# Patient Record
(demographics unavailable — no encounter records)

---

## 2024-10-15 NOTE — ASSESSMENT
[FreeTextEntry1] : YO vs OAB and management reviewed. OAB txs reviewed. Behavioral modifications, bladder training, Getmesa SEs, PTNS botox or SNM discussed. Con't Gemtesa 1 more year with hopes of weaning down in future. RTO 1 year or prn. Rx renewed. All ques answered.

## 2024-10-15 NOTE — HISTORY OF PRESENT ILLNESS
[FreeTextEntry1] : Patient last seen by myself early 2023. She is a P2, with DIMITRI. On exam, she had a neg CST, +urethral hypermob, and no signif POP on POPQ, and PVR was normal. She has undergone periurethral bulking injections, and Gemtesa has been rx'd. Still denies YO since the urethral bulking. Gemtesa helps reduce freq urge, no UUI present. No dysuria, UTIs, subj incomplete emptying, gross hematuria, vaginal bulge. Happy on Gemtesa and denies SEs. DRinks "a lot of water."  PMH hyperlipidemia, mitral regurg, LBP, osteoporosis PSH C/S x 2, liposuction Prior tobacco smoker NKDA  UDS: pvr normal at end? (unclear PVR 85 ml at start and capacity only 70 ml fill, however voided over 300 ml at end), no DO, +GSUI, no ISD.

## 2024-11-05 NOTE — PHYSICAL EXAM
[Chaperone Present] : A chaperone was present in the examining room during all aspects of the physical examination [[id number]] : A chaperone was present during the pelvic exam. [Appropriately responsive] : appropriately responsive [Alert] : alert [No Acute Distress] : no acute distress [Oriented x3] : oriented x3 [Labia Majora] : normal [Labia Minora] : normal [Labia Majora Erythema] : erythema [Labia Minora Erythema] : erythema [Atrophy] : atrophy [Dry Mucosa] : dry mucosa [No Bleeding] : There was no active vaginal bleeding [Normal] : normal [Uterine Adnexae] : non-palpable

## 2024-11-05 NOTE — PLAN
[FreeTextEntry1] : Erythematous externally with vaginal discharge consistent with yeast. Prescriptions for Lotrisone, Terconazole, & Diflucan given. Aptima culture collected to r/o other vaginal infections.   Will switch from Yuvafem to Estring as the pt is still having significant pain with sex. Rx sent for Estring given.   F/u annually or as needed.

## 2024-11-05 NOTE — HISTORY OF PRESENT ILLNESS
[HPV test offered] : HPV test offered [N] : Patient does not use contraception [Y] : Positive pregnancy history [unknown] : Patient is unsure of the date of her LMP [Menarche Age: ____] : age at menarche was [unfilled] [Currently Active] : currently active [Men] : men [Mammogramdate] : 10/21/2024 [TextBox_19] : BR2 [BreastSonogramDate] : 10/21/2024 [TextBox_25] : BR2 [PapSmeardate] : 03/29/2024 [TextBox_31] : Negative [BoneDensityDate] : 04/26/2024 [TextBox_37] : Osteoporosis  [TextBox_43] : [place]: 2024 WNL as per pt [HPVDate] : 03/29/2024 [TextBox_78] : Negative [PGHxTotal] : 2 [facility] [FreeTextEntry1] : C/S: 2 Hysterectomy: No D&C: No Gardasil Vaccine: No

## 2024-11-05 NOTE — PHYSICAL EXAM
[Chaperone Present] : A chaperone was present in the examining room during all aspects of the physical examination [[id number]] : A chaperone was present during the pelvic exam. [Appropriately responsive] : appropriately responsive [Alert] : alert [No Acute Distress] : no acute distress [Oriented x3] : oriented x3 [Labia Majora] : normal [Labia Majora Erythema] : erythema [Labia Minora] : normal [Labia Minora Erythema] : erythema [Atrophy] : atrophy [Dry Mucosa] : dry mucosa [No Bleeding] : There was no active vaginal bleeding [Normal] : normal [Uterine Adnexae] : non-palpable

## 2024-11-05 NOTE — END OF VISIT
[FreeTextEntry3] : I, Kelsea Ayon solely acted as scribe for Dr. Mariposa Garcia on 10/28/2024  All medical entries made by the Scribe were at my, Dr. Sims, direction and personally dictated by me on 10/28/2024 . I have reviewed the chart and agree that the record accurately reflects my personal performance of the history, physical exam, assessment and plan. I have also personally directed, reviewed, and agreed with the chart.

## 2024-11-05 NOTE — HISTORY OF PRESENT ILLNESS
[HPV test offered] : HPV test offered [N] : Patient does not use contraception [Y] : Positive pregnancy history [unknown] : Patient is unsure of the date of her LMP [Menarche Age: ____] : age at menarche was [unfilled] [Currently Active] : currently active [Men] : men [Mammogramdate] : 10/21/2024 [TextBox_19] : BR2 [BreastSonogramDate] : 10/21/2024 [TextBox_25] : BR2 [PapSmeardate] : 03/29/2024 [TextBox_31] : Negative [BoneDensityDate] : 04/26/2024 [TextBox_37] : Osteoporosis  [TextBox_43] : [place]: 2024 WNL as per pt [HPVDate] : 03/29/2024 [TextBox_78] : Negative [PGHxTotal] : 2 [[facility] : 2 [[facility] : 2 [FreeTextEntry1] : C/S: 2 Hysterectomy: No D&C: No Gardasil Vaccine: No

## 2024-12-30 NOTE — HISTORY OF PRESENT ILLNESS
[FreeTextEntry1] : Pt presents to office for f/u on DIMITRI.  YO remains controlled s/p periurethral bulking on 4/7/23.  She was on gemtesa for urgency/frequency, but this was no longer covered by insurance.  Medication was changed to mirabegron 25mg 11/21/24.  Today she reports 100% improvement in her symptoms with the mirabegron.  States that she was "miserable" when she ran out of the gemtesa stating she had "UTI's and yeast infections" and needed to see her PMD.  She denies any SE from the medication.  Denies any dysuria, incomplete emptying or UTI symptoms today.  She does not want to come off the medication.

## 2024-12-30 NOTE — DISCUSSION/SUMMARY
[FreeTextEntry1] : Pt happy with mirabegron 25mg and would like to continue.  We discussed that the medication is not intended to prevent UTI or yeast infections, it is a treatment for urgency/frequency/UUI and she verbalizes understanding.  eRx sent.  F/u 9-12 months or sooner if needed.  Instructed to call with any questions or concerns and she verbalizes understanding.

## 2025-02-03 NOTE — ASSESSMENT
[FreeTextEntry1] : RICKY CARMONA is a 61 year old F with a past medical history as above who presents for general follow up and blood work,

## 2025-02-03 NOTE — PHYSICAL EXAM
[No Acute Distress] : no acute distress [Well Nourished] : well nourished [Well Developed] : well developed [Well-Appearing] : well-appearing [Normal Voice/Communication] : normal voice/communication [Normal Sclera/Conjunctiva] : normal sclera/conjunctiva [PERRL] : pupils equal round and reactive to light [EOMI] : extraocular movements intact [Normal Outer Ear/Nose] : the outer ears and nose were normal in appearance [Normal Oropharynx] : the oropharynx was normal [Normal TMs] : both tympanic membranes were normal [Normal Nasal Mucosa] : the nasal mucosa was normal [No JVD] : no jugular venous distention [No Lymphadenopathy] : no lymphadenopathy [Supple] : supple [Thyroid Normal, No Nodules] : the thyroid was normal and there were no nodules present [No Respiratory Distress] : no respiratory distress  [No Accessory Muscle Use] : no accessory muscle use [Clear to Auscultation] : lungs were clear to auscultation bilaterally [Normal Rate] : normal rate  [Regular Rhythm] : with a regular rhythm [Normal S1, S2] : normal S1 and S2 [No Murmur] : no murmur heard [No Carotid Bruits] : no carotid bruits [No Abdominal Bruit] : a ~M bruit was not heard ~T in the abdomen [Pedal Pulses Present] : the pedal pulses are present [No Edema] : there was no peripheral edema [No Palpable Aorta] : no palpable aorta [Soft] : abdomen soft [Non Tender] : non-tender [Non-distended] : non-distended [No Masses] : no abdominal mass palpated [No HSM] : no HSM [Normal Bowel Sounds] : normal bowel sounds [Normal Supraclavicular Nodes] : no supraclavicular lymphadenopathy [Normal Posterior Cervical Nodes] : no posterior cervical lymphadenopathy [Normal Anterior Cervical Nodes] : no anterior cervical lymphadenopathy [No CVA Tenderness] : no CVA  tenderness [No Spinal Tenderness] : no spinal tenderness [No Joint Swelling] : no joint swelling [Grossly Normal Strength/Tone] : grossly normal strength/tone [No Rash] : no rash [Coordination Grossly Intact] : coordination grossly intact [No Focal Deficits] : no focal deficits [Normal Gait] : normal gait [Deep Tendon Reflexes (DTR)] : deep tendon reflexes were 2+ and symmetric [Speech Grossly Normal] : speech grossly normal [Memory Grossly Normal] : memory grossly normal [Normal Affect] : the affect was normal [Alert and Oriented x3] : oriented to person, place, and time [Normal Mood] : the mood was normal [Normal Insight/Judgement] : insight and judgment were intact [Normal] : affect was normal and insight and judgment were intact [Kyphosis] : no kyphosis [Scoliosis] : no scoliosis [Acne] : no acne [de-identified] : overweight

## 2025-02-03 NOTE — PLAN
[FreeTextEntry1] : Pulmonary snoring - possibly secondary to JAKE - previously referred to pulmonologist, Dr. Gomes to further discuss home sleep study - discussed the ramifications of untreated JAKE; patient aware of risks - c/w weight loss Cardiology hyperlipidemia - continue low cholesterol/low fat diet - check FLP, c/w Ezetimibe 10 mg qd,  consider checking CT heart/calcium score Endocrinology hypothyroidism - restart Levothyroxine Sodium 25mcg p.o.q.d. as directed-check TTFs overweight/weight gain - RX Qsymia 11.25-69  MG p.o.q.d; continue low carbohydrate/low sugar diet; continue CV exercise - continue to follow up with nutritionist Dermatology hair loss - had negative medical work up, previously discussed PRP; continue Minoxidil 10 MG p.o.d.q. as directed; continue Spironolactone 100 MG p.o.q.d. as directed; advised to continue f/u with dermatologist/endocrinologist Genetics/Oncology - referred to  to assess for BRACA secondary to recent family history of neoplasm of the breast.   check- CBC, CMP, Lipid Profile, Anti Thyroid Antibodies, Free T3-4, TSH, Vitamin D

## 2025-02-03 NOTE — HISTORY OF PRESENT ILLNESS
[FreeTextEntry1] : fasting blood work and general follow-up  [de-identified] : Patient is a 61-year-old female with a past medical history as below who presents for blood work and general follow-up. Patient is taking all medications as prescribed and denies any adverse reactions. Patient is taking Qsymia 11.25/69 mg qd and has lost 29 pounds since starting the medication in February 2023. She has gained 3 pounds in the past 3 months since her last visit,. Patient has been exercising regularly (treadmill, Pilates). She has been maintaining a healthy diet with no chicken and red meat since 1/28/2024. She has started Ezetimibe 10 mg qd (would not go on a statin).  Patient inquired about getting BRACA gene testing as her mother recently was diagnosed with breast cancer.

## 2025-02-03 NOTE — END OF VISIT
[FreeTextEntry3] : I, Kisha Carr, acted solely as scribe for Dr. Vic Gagnon DO on this date 02/03/2025.  All medical record entries made by the Scribe were at my, Dr. Vic Gagnon DO direction and personally dictated by me on 02/03/2025, I have reviewed the chart and agree that the record accurately reflects my personal performance of the history, physical exam, assessment and plan. I have also personally directed, reviewed and agreed with the chart.   [Time Spent: ___ minutes] : I have spent [unfilled] minutes of time on the encounter which excludes teaching and separately reported services.

## 2025-02-03 NOTE — HISTORY OF PRESENT ILLNESS
[FreeTextEntry1] : fasting blood work and general follow-up  [de-identified] : Patient is a 61-year-old female with a past medical history as below who presents for blood work and general follow-up. Patient is taking all medications as prescribed and denies any adverse reactions. Patient is taking Qsymia 11.25/69 mg qd and has lost 29 pounds since starting the medication in February 2023. She has gained 3 pounds in the past 3 months since her last visit,. Patient has been exercising regularly (treadmill, Pilates). She has been maintaining a healthy diet with no chicken and red meat since 1/28/2024. She has started Ezetimibe 10 mg qd (would not go on a statin).  Patient inquired about getting BRACA gene testing as her mother recently was diagnosed with breast cancer.

## 2025-02-03 NOTE — PHYSICAL EXAM
[No Acute Distress] : no acute distress [Well Nourished] : well nourished [Well Developed] : well developed [Well-Appearing] : well-appearing [Normal Voice/Communication] : normal voice/communication [Normal Sclera/Conjunctiva] : normal sclera/conjunctiva [PERRL] : pupils equal round and reactive to light [EOMI] : extraocular movements intact [Normal Outer Ear/Nose] : the outer ears and nose were normal in appearance [Normal Oropharynx] : the oropharynx was normal [Normal TMs] : both tympanic membranes were normal [Normal Nasal Mucosa] : the nasal mucosa was normal [No JVD] : no jugular venous distention [No Lymphadenopathy] : no lymphadenopathy [Supple] : supple [Thyroid Normal, No Nodules] : the thyroid was normal and there were no nodules present [No Respiratory Distress] : no respiratory distress  [No Accessory Muscle Use] : no accessory muscle use [Clear to Auscultation] : lungs were clear to auscultation bilaterally [Normal Rate] : normal rate  [Regular Rhythm] : with a regular rhythm [Normal S1, S2] : normal S1 and S2 [No Murmur] : no murmur heard [No Carotid Bruits] : no carotid bruits [No Abdominal Bruit] : a ~M bruit was not heard ~T in the abdomen [Pedal Pulses Present] : the pedal pulses are present [No Edema] : there was no peripheral edema [No Palpable Aorta] : no palpable aorta [Soft] : abdomen soft [Non Tender] : non-tender [Non-distended] : non-distended [No Masses] : no abdominal mass palpated [No HSM] : no HSM [Normal Bowel Sounds] : normal bowel sounds [Normal Supraclavicular Nodes] : no supraclavicular lymphadenopathy [Normal Posterior Cervical Nodes] : no posterior cervical lymphadenopathy [Normal Anterior Cervical Nodes] : no anterior cervical lymphadenopathy [No CVA Tenderness] : no CVA  tenderness [No Spinal Tenderness] : no spinal tenderness [No Joint Swelling] : no joint swelling [Grossly Normal Strength/Tone] : grossly normal strength/tone [No Rash] : no rash [Coordination Grossly Intact] : coordination grossly intact [No Focal Deficits] : no focal deficits [Normal Gait] : normal gait [Deep Tendon Reflexes (DTR)] : deep tendon reflexes were 2+ and symmetric [Speech Grossly Normal] : speech grossly normal [Memory Grossly Normal] : memory grossly normal [Normal Affect] : the affect was normal [Alert and Oriented x3] : oriented to person, place, and time [Normal Mood] : the mood was normal [Normal Insight/Judgement] : insight and judgment were intact [Normal] : affect was normal and insight and judgment were intact [Kyphosis] : no kyphosis [Scoliosis] : no scoliosis [Acne] : no acne [de-identified] : overweight

## 2025-03-31 NOTE — PHYSICAL EXAM
[Chaperone Present] : A chaperone was present in the examining room during all aspects of the physical examination [Appropriately responsive] : appropriately responsive [Alert] : alert [No Acute Distress] : no acute distress [Soft] : soft [Non-tender] : non-tender [Non-distended] : non-distended [Oriented x3] : oriented x3 [Examination Of The Breasts] : a normal appearance [No Discharge] : no discharge [No Masses] : no breast masses were palpable [Labia Majora] : normal [Labia Minora] : normal [Atrophy] : atrophy [Dry Mucosa] : dry mucosa [No Bleeding] : There was no active vaginal bleeding [Normal] : normal [Discharge] : a  ~M vaginal discharge was present [Moderate] : moderate [White] : white [Thick] : thick [Uterine Adnexae] : non-palpable

## 2025-03-31 NOTE — END OF VISIT
[FreeTextEntry3] : I, Kelsea Ayon solely acted as scribe for Dr. Mariposa Garcia on 03/31/2025  All medical entries made by the Scribe were at my, Dr. Sims, direction and personally dictated by me on 03/31/2025 . I have reviewed the chart and agree that the record accurately reflects my personal performance of the history, physical exam, assessment and plan. I have also personally directed, reviewed, and agreed with the chart.

## 2025-03-31 NOTE — HISTORY OF PRESENT ILLNESS
[LMP unknown] : LMP unknown [N] : Patient does not use contraception [Y] : Positive pregnancy history [Mammogramdate] : 10/21/24 [TextBox_19] : Br2 [BreastSonogramDate] : 10/21/24 [TextBox_25] : Br2 [PapSmeardate] : 3/29/24 [TextBox_31] : Neg [BoneDensityDate] : 4/26/24 [TextBox_37] : Osteoporosis [TextBox_43] : [place], 2024 Normal  [SyphilisDate] : 4/10/24 [TextBox_58] : Neg  [GonorrheaDate] : 4/10/24 [TextBox_63] : Neg [HPVDate] : 3/29/24 [TextBox_78] : Neg [PGHxTotal] : 2 [[facility]] : 2 [[facility]] : 2 [FreeTextEntry1] : C/sec: 2

## 2025-03-31 NOTE — HISTORY OF PRESENT ILLNESS
[LMP unknown] : LMP unknown [N] : Patient does not use contraception [Y] : Positive pregnancy history [Mammogramdate] : 10/21/24 [TextBox_19] : Br2 [BreastSonogramDate] : 10/21/24 [TextBox_25] : Br2 [PapSmeardate] : 3/29/24 [TextBox_31] : Neg [BoneDensityDate] : 4/26/24 [TextBox_37] : Osteoporosis [TextBox_43] : [place], 2024 Normal  [SyphilisDate] : 4/10/24 [TextBox_58] : Neg  [GonorrheaDate] : 4/10/24 [TextBox_63] : Neg [HPVDate] : 3/29/24 [TextBox_78] : Neg [PGHxTotal] : 2 [[facility]] : 2 [[facility]] : 2 [FreeTextEntry1] : C/sec: 2

## 2025-03-31 NOTE — PLAN
[FreeTextEntry1] : Up to date with colon cancer screening.   Osteoporosis. On Prolia. Continue care with rheumatology.   Pap deferred until 2027 per ASCCP guidelines.   Prescription for Diflucan given secondary to a possible yeast infection.   Prescription renewal for estring given.   Prescription for mammogram screening and breast US given. Self-breast exam reviewed.  She will follow up annually and as needed.

## 2025-04-17 NOTE — HISTORY OF PRESENT ILLNESS
[FreeTextEntry1] : RICKY 62 yo  LMP  is here today for a possible infection.   Pt reports burning with urination and has constant irritation.   States that sex is still feels like needles.  She did use the Lotrisone cream on the outside and did take diflucan orally. She put metrogel inside the vagina last night.   [Mammogramdate] : 10/21/2024 [TextBox_19] : BR2 [BreastSonogramDate] : 10/21/2024 [TextBox_25] : BR2 [PapSmeardate] : 03/29/2024 [BoneDensityDate] : 04/26/2024 [TextBox_31] : NEG [TextBox_37] : OSTEOPOROSIS [GonorrheaDate] : 04/10/2024 [TextBox_63] : NEG [ChlamydiaDate] : 04/10/2024 [TextBox_68] : NEG [TrichomonasDate] : 04/10/2024 [TextBox_73] : NEG [HPVDate] : 03/29/2024 [TextBox_78] : NEG [PGHxTotal] : 2 [[facility]] : 2 [[facility]] : 2

## 2025-04-17 NOTE — END OF VISIT
[FreeTextEntry3] : I, Kelsea Ayon solely acted as scribe for Dr. Mariposa Garcia on 04/14/2025  All medical entries made by the Scribe were at my, Dr. Sims, direction and personally dictated by me on 04/14/2025 . I have reviewed the chart and agree that the record accurately reflects my personal performance of the history, physical exam, assessment and plan. I have also personally directed, reviewed, and agreed with the chart.

## 2025-04-17 NOTE — PLAN
[FreeTextEntry1] : Erythematous externally consistent with yeast despite lotrisone use. Will try triamcinolone and estrace cream. She will use triamcinolone cream morning and night, and then estrace in the middle of the day once a day.  Will continue metrogel for one more night as she has already started it. Prescription renewal for metrogel given. If the aptima culture shows yeast, will switch to terconazole.   Aptima culture collected to r/o an internal vaginal infection. UCX sent out to r/o a UTI.   F/u annually or as needed.

## 2025-04-17 NOTE — HISTORY OF PRESENT ILLNESS
[FreeTextEntry1] : RICKY 60 yo  LMP  is here today for a possible infection.   Pt reports burning with urination and has constant irritation.   States that sex is still feels like needles.  She did use the Lotrisone cream on the outside and did take diflucan orally. She put metrogel inside the vagina last night.   [Mammogramdate] : 10/21/2024 [TextBox_19] : BR2 [BreastSonogramDate] : 10/21/2024 [TextBox_25] : BR2 [PapSmeardate] : 03/29/2024 [BoneDensityDate] : 04/26/2024 [TextBox_31] : NEG [TextBox_37] : OSTEOPOROSIS [GonorrheaDate] : 04/10/2024 [TextBox_63] : NEG [ChlamydiaDate] : 04/10/2024 [TextBox_68] : NEG [TrichomonasDate] : 04/10/2024 [TextBox_73] : NEG [HPVDate] : 03/29/2024 [TextBox_78] : NEG [PGHxTotal] : 2 [[facility]] : 2 [[facility]] : 2

## 2025-05-05 NOTE — HISTORY OF PRESENT ILLNESS
[FreeTextEntry1] : medical weight loss program and general follow-up  [de-identified] : Patient is a 61-year-old female with a past medical history as below who presents for medical weight loss program and general follow-up. Patient is taking all medications as prescribed and denies any adverse reactions. Patient is taking Qsymia 15/92 mg qd and has stopped losing weight and dose causes adverse effects. She has gained 8 pounds since her last visit in April of 2025. Patient is inquiring about starting a GLP-1 agonist, alternate to Qsymia. Patient has been exercising regularly (treadmill, Pilates). She has been maintaining a healthy diet with no chicken and red meat since 1/28/2024. She has started Ezetimibe 10 mg qd (would not go on a statin).   Patient is taking Prolia injections secondary to Osteoporosis, manage by endocrinologist, Dr. Janice Soriano.  Patient inquire about getting a sleep study due to her h/o snoring, daytime somnolence.

## 2025-05-05 NOTE — REVIEW OF SYSTEMS
[Recent Change In Weight] : ~T recent weight change [Negative] : Heme/Lymph [FreeTextEntry6] : snoring, daytime somnolence

## 2025-05-05 NOTE — ASSESSMENT
[FreeTextEntry1] : RICKY CARMONA is a 61 year old F with a past medical history as above who presents for general follow up and medical weight loss program.

## 2025-05-05 NOTE — PHYSICAL EXAM
[No Acute Distress] : no acute distress [Well Nourished] : well nourished [Well Developed] : well developed [Well-Appearing] : well-appearing [Normal Voice/Communication] : normal voice/communication [Normal Sclera/Conjunctiva] : normal sclera/conjunctiva [PERRL] : pupils equal round and reactive to light [EOMI] : extraocular movements intact [Normal Outer Ear/Nose] : the outer ears and nose were normal in appearance [Normal Oropharynx] : the oropharynx was normal [Normal TMs] : both tympanic membranes were normal [Normal Nasal Mucosa] : the nasal mucosa was normal [No JVD] : no jugular venous distention [No Lymphadenopathy] : no lymphadenopathy [Supple] : supple [Thyroid Normal, No Nodules] : the thyroid was normal and there were no nodules present [No Respiratory Distress] : no respiratory distress  [No Accessory Muscle Use] : no accessory muscle use [Clear to Auscultation] : lungs were clear to auscultation bilaterally [Normal Rate] : normal rate  [Regular Rhythm] : with a regular rhythm [Normal S1, S2] : normal S1 and S2 [No Murmur] : no murmur heard [No Carotid Bruits] : no carotid bruits [No Abdominal Bruit] : a ~M bruit was not heard ~T in the abdomen [Pedal Pulses Present] : the pedal pulses are present [No Edema] : there was no peripheral edema [No Palpable Aorta] : no palpable aorta [Soft] : abdomen soft [Non Tender] : non-tender [Non-distended] : non-distended [No Masses] : no abdominal mass palpated [No HSM] : no HSM [Normal Bowel Sounds] : normal bowel sounds [Normal Supraclavicular Nodes] : no supraclavicular lymphadenopathy [Normal Posterior Cervical Nodes] : no posterior cervical lymphadenopathy [Normal Anterior Cervical Nodes] : no anterior cervical lymphadenopathy [No CVA Tenderness] : no CVA  tenderness [No Spinal Tenderness] : no spinal tenderness [No Joint Swelling] : no joint swelling [Grossly Normal Strength/Tone] : grossly normal strength/tone [No Rash] : no rash [Coordination Grossly Intact] : coordination grossly intact [No Focal Deficits] : no focal deficits [Normal Gait] : normal gait [Deep Tendon Reflexes (DTR)] : deep tendon reflexes were 2+ and symmetric [Speech Grossly Normal] : speech grossly normal [Memory Grossly Normal] : memory grossly normal [Normal Affect] : the affect was normal [Alert and Oriented x3] : oriented to person, place, and time [Normal Mood] : the mood was normal [Normal Insight/Judgement] : insight and judgment were intact [Normal] : affect was normal and insight and judgment were intact [Kyphosis] : no kyphosis [Scoliosis] : no scoliosis [Acne] : no acne [de-identified] : overweight

## 2025-05-05 NOTE — HEALTH RISK ASSESSMENT
[4 or more  times a week (4 pts)] : 4 or more  times a week (4 points) [1 or 2 (0 pts)] : 1 or 2 (0 points) [Less than monthly (1 pt)] : Less than monthly (1 point) [No] : In the past 12 months have you used drugs other than those required for medical reasons? No [Little interest or pleasure doing things] : 1) Little interest or pleasure doing things [Feeling down, depressed, or hopeless] : 2) Feeling down, depressed, or hopeless [0] : 2) Feeling down, depressed, or hopeless: Not at all (0) [PHQ-2 Negative - No further assessment needed] : PHQ-2 Negative - No further assessment needed [Audit-CScore] : 5 [BFP2Lqbom] : 0

## 2025-05-05 NOTE — END OF VISIT
[FreeTextEntry3] : I, Kisha Carr, acted solely as scribe for Dr. Vic Gagnon DO on this date 05/05/2025.   All medical record entries made by the Scribe were at my, Dr. Vic Gagnon DO direction and personally dictated by me on 05/05/2025, I have reviewed the chart and agree that the record accurately reflects my personal performance of the history, physical exam, assessment and plan. I have also personally directed, reviewed and agreed with the chart.     [Time Spent: ___ minutes] : I have spent [unfilled] minutes of time on the encounter which excludes teaching and separately reported services.

## 2025-05-05 NOTE — PHYSICAL EXAM
[No Acute Distress] : no acute distress [Well Nourished] : well nourished [Well Developed] : well developed [Well-Appearing] : well-appearing [Normal Voice/Communication] : normal voice/communication [Normal Sclera/Conjunctiva] : normal sclera/conjunctiva [PERRL] : pupils equal round and reactive to light [EOMI] : extraocular movements intact [Normal Outer Ear/Nose] : the outer ears and nose were normal in appearance [Normal Oropharynx] : the oropharynx was normal [Normal TMs] : both tympanic membranes were normal [Normal Nasal Mucosa] : the nasal mucosa was normal [No JVD] : no jugular venous distention [No Lymphadenopathy] : no lymphadenopathy [Supple] : supple [Thyroid Normal, No Nodules] : the thyroid was normal and there were no nodules present [No Respiratory Distress] : no respiratory distress  [No Accessory Muscle Use] : no accessory muscle use [Clear to Auscultation] : lungs were clear to auscultation bilaterally [Normal Rate] : normal rate  [Regular Rhythm] : with a regular rhythm [Normal S1, S2] : normal S1 and S2 [No Murmur] : no murmur heard [No Carotid Bruits] : no carotid bruits [No Abdominal Bruit] : a ~M bruit was not heard ~T in the abdomen [Pedal Pulses Present] : the pedal pulses are present [No Edema] : there was no peripheral edema [No Palpable Aorta] : no palpable aorta [Soft] : abdomen soft [Non Tender] : non-tender [Non-distended] : non-distended [No Masses] : no abdominal mass palpated [No HSM] : no HSM [Normal Bowel Sounds] : normal bowel sounds [Normal Supraclavicular Nodes] : no supraclavicular lymphadenopathy [Normal Posterior Cervical Nodes] : no posterior cervical lymphadenopathy [Normal Anterior Cervical Nodes] : no anterior cervical lymphadenopathy [No CVA Tenderness] : no CVA  tenderness [No Spinal Tenderness] : no spinal tenderness [No Joint Swelling] : no joint swelling [Grossly Normal Strength/Tone] : grossly normal strength/tone [No Rash] : no rash [Coordination Grossly Intact] : coordination grossly intact [No Focal Deficits] : no focal deficits [Normal Gait] : normal gait [Deep Tendon Reflexes (DTR)] : deep tendon reflexes were 2+ and symmetric [Speech Grossly Normal] : speech grossly normal [Memory Grossly Normal] : memory grossly normal [Normal Affect] : the affect was normal [Alert and Oriented x3] : oriented to person, place, and time [Normal Mood] : the mood was normal [Normal Insight/Judgement] : insight and judgment were intact [Normal] : affect was normal and insight and judgment were intact [Kyphosis] : no kyphosis [Scoliosis] : no scoliosis [Acne] : no acne [de-identified] : overweight

## 2025-05-05 NOTE — COUNSELING
[Potential consequences of obesity discussed] : Potential consequences of obesity discussed [Benefits of weight loss discussed] : Benefits of weight loss discussed [Structured Weight Management Program suggested:] : Structured weight management program suggested [Encouraged to increase physical activity] : Encouraged to increase physical activity [Target Wt Loss Goal ___] : Weight Loss Goals: Target weight loss goal [unfilled] lbs [Weigh Self Weekly] : weigh self weekly [Decrease Portions] : decrease portions [____ min/wk Activity] : [unfilled] min/wk activity [Good understanding] : Patient has a good understanding of disease, goals and obesity follow-up plan [FreeTextEntry1] : low carbohydrate diet [FreeTextEntry4] : 15

## 2025-05-05 NOTE — PLAN
[FreeTextEntry1] : Pulmonary snoring/daytime somnolence - possibly secondary to JAKE - Rx referral provided to pulmonologist, Dr. Bassam Farmer to further discuss home sleep study - discussed the ramifications of untreated JAKE; patient aware of risks - c/w weight loss Cardiology hyperlipidemia - continue low cholesterol/low fat diet - check FLP in 3 months, c/w Ezetimibe 10 mg qd,  consider checking CT heart/calcium score Endocrinology hypothyroidism - c/w Levothyroxine Sodium 25mcg p.o.q.d. as directed-check TTFs in 3 months overweight/weight gain - stop Qsymia; Discussed  starting Zepbound 2.5mg SQ weekly pending medical insurance coverage clearance; discussed common side effects including gastrointestinal symptoms like constipation, diarrhea, GERD, nausea and vomiting and more significant adverse effects like pancreatitis, and increased risk of thyroid cancer. Patient understand plan and related adverse effects; continue low carbohydrate/low sugar diet; continue CV exercise - continue to follow up with nutritionist Endocrinology Osteoporosis- c/w Prolia 60mg/mL SQ 1q 6months; continue to f/u with Dr. Janice Soriano Dermatology hair loss - had negative medical work up, previously discussed PRP; continue Minoxidil 10 MG p.o.d.q. as directed; continue Spironolactone 100 MG p.o.q.d. as directed; advised to continue f/u with dermatologist/endocrinologist  follow up in 3 months

## 2025-05-05 NOTE — HEALTH RISK ASSESSMENT
[4 or more  times a week (4 pts)] : 4 or more  times a week (4 points) [1 or 2 (0 pts)] : 1 or 2 (0 points) [Less than monthly (1 pt)] : Less than monthly (1 point) [No] : In the past 12 months have you used drugs other than those required for medical reasons? No [Little interest or pleasure doing things] : 1) Little interest or pleasure doing things [Feeling down, depressed, or hopeless] : 2) Feeling down, depressed, or hopeless [0] : 2) Feeling down, depressed, or hopeless: Not at all (0) [PHQ-2 Negative - No further assessment needed] : PHQ-2 Negative - No further assessment needed [Audit-CScore] : 5 [[id number]] : 0

## 2025-05-05 NOTE — HISTORY OF PRESENT ILLNESS
[FreeTextEntry1] : medical weight loss program and general follow-up  [de-identified] : Patient is a 61-year-old female with a past medical history as below who presents for medical weight loss program and general follow-up. Patient is taking all medications as prescribed and denies any adverse reactions. Patient is taking Qsymia 15/92 mg qd and has stopped losing weight and dose causes adverse effects. She has gained 8 pounds since her last visit in April of 2025. Patient is inquiring about starting a GLP-1 agonist, alternate to Qsymia. Patient has been exercising regularly (treadmill, Pilates). She has been maintaining a healthy diet with no chicken and red meat since 1/28/2024. She has started Ezetimibe 10 mg qd (would not go on a statin).   Patient is taking Prolia injections secondary to Osteoporosis, manage by endocrinologist, Dr. Janice Soriano.  Patient inquire about getting a sleep study due to her h/o snoring, daytime somnolence.

## 2025-07-08 NOTE — PLAN
[TextEntry] : Sleep study. Reviewed with the patient what sleep apnea is, pathophysiology of sleep apnea. Reviewed how we diagnose it. Reviewed short and long term health consequences. All questions answered.

## 2025-07-08 NOTE — PHYSICAL EXAM
[No Acute Distress] : no acute distress [Low Lying Soft Palate] : low lying soft palate [Elongated Uvula] : elongated uvula [Enlarged Base of the Tongue] : enlarged base of the tongue [III] : Mallampati Class: III [Normal Rate/Rhythm] : normal rate/rhythm [Normal S1, S2] : normal s1, s2 [No Resp Distress] : no resp distress [No Acc Muscle Use] : no acc muscle use [Normal Rhythm and Effort] : normal rhythm and effort [Clear to Auscultation Bilaterally] : clear to auscultation bilaterally [Normal Color/ Pigmentation] : normal color/ pigmentation [Oriented x3] : oriented x3 [Normal Mood] : normal mood [Normal Affect] : normal affect

## 2025-07-08 NOTE — HISTORY OF PRESENT ILLNESS
[Former] : former [< 20 pack-years] : < 20 pack-years [TextBox_4] : Wakes up gasping, has snoring, EDS with ESS 19.  To bed: 10:15-11:15p Latency: < 5min WASO: 2-3X for BR Awake: 6-6:40a  Was on Qysmia for weight loss. Reported worsened lethargy. Now on ozempic. Feeling better.   Exercises every day on treadmill, weights.   Works FT selling wine.   Stopped smoking in her 20s. [ESS] : 19

## 2025-07-13 NOTE — PLAN
[FreeTextEntry1] : Pulmonary snoring/daytime somnolence - possibly secondary to JAKE - await PSG through Dr. Farmer Cardiology hyperlipidemia - continue low cholesterol/low fat diet - c/w Ezetimibe 10 mg qd,  consider checking CT heart/calcium score Endocrinology hypothyroidism - c/w Levothyroxine Sodium 25mcg p.o.q.d. as directed-check TTFs in 3 months overweight/weight gain - off Qsymia; increase Zepbound to.5mg SQ weekly- discussed common side effects including gastrointestinal symptoms like constipation, diarrhea, GERD, nausea and vomiting and more significant adverse effects like pancreatitis, and increased risk of thyroid cancer. Patient understand plan and related adverse effects; continue low carbohydrate/low sugar diet; continue CV exercise - continue to follow up with nutritionist Endocrinology Osteoporosis- c/w Prolia 60mg/mL SQ 1q 6months; continue to f/u with Dr. Janice Soriano  follow up in 3 months

## 2025-07-13 NOTE — HEALTH RISK ASSESSMENT
[4 or more  times a week (4 pts)] : 4 or more  times a week (4 points) [1 or 2 (0 pts)] : 1 or 2 (0 points) [Less than monthly (1 pt)] : Less than monthly (1 point) [No] : In the past 12 months have you used drugs other than those required for medical reasons? No [Little interest or pleasure doing things] : 1) Little interest or pleasure doing things [Feeling down, depressed, or hopeless] : 2) Feeling down, depressed, or hopeless [0] : 2) Feeling down, depressed, or hopeless: Not at all (0) [PHQ-2 Negative - No further assessment needed] : PHQ-2 Negative - No further assessment needed [Audit-CScore] : 5 [[id number]] : 0

## 2025-07-13 NOTE — HISTORY OF PRESENT ILLNESS
[FreeTextEntry1] : medical weight loss program and general follow-up  [de-identified] : Patient is a 61-year-old female with a past medical history as below who presents for medical weight loss program and general follow-up. Patient is taking all medications as prescribed and denies any adverse reactions. Patient is off Qsymia 15/92 mg qd and has started Zepbound 2.5 mg for about 7 weeks. She has tolerated the Zepbound and denies any significant side effects. She has lost 10 pounds since change in medications. Patient has been exercising regularly (treadmill, Pilates). She has been maintaining a healthy diet with no chicken and red meat since 1/28/2024. She continues to take Ezetimibe 10 mg qd (would not go on a statin).   Patient is taking Prolia injections secondary to Osteoporosis, manage by endocrinologist, Dr. Janice Soriano.  Patient saw pulmonologist, Dr. Bassam Farmer for a sleep consultation secondary to potential symptoms of JAKE. She is awaiting financial authorization to do PSG.

## 2025-07-13 NOTE — PHYSICAL EXAM
[No Acute Distress] : no acute distress [Well Nourished] : well nourished [Well Developed] : well developed [Well-Appearing] : well-appearing [Normal Voice/Communication] : normal voice/communication [Normal Sclera/Conjunctiva] : normal sclera/conjunctiva [PERRL] : pupils equal round and reactive to light [EOMI] : extraocular movements intact [Normal Outer Ear/Nose] : the outer ears and nose were normal in appearance [Normal Oropharynx] : the oropharynx was normal [Normal TMs] : both tympanic membranes were normal [Normal Nasal Mucosa] : the nasal mucosa was normal [No JVD] : no jugular venous distention [No Lymphadenopathy] : no lymphadenopathy [Supple] : supple [Thyroid Normal, No Nodules] : the thyroid was normal and there were no nodules present [No Respiratory Distress] : no respiratory distress  [No Accessory Muscle Use] : no accessory muscle use [Clear to Auscultation] : lungs were clear to auscultation bilaterally [Normal Rate] : normal rate  [Regular Rhythm] : with a regular rhythm [Normal S1, S2] : normal S1 and S2 [No Murmur] : no murmur heard [No Carotid Bruits] : no carotid bruits [No Abdominal Bruit] : a ~M bruit was not heard ~T in the abdomen [Pedal Pulses Present] : the pedal pulses are present [No Edema] : there was no peripheral edema [No Palpable Aorta] : no palpable aorta [Soft] : abdomen soft [Non Tender] : non-tender [Non-distended] : non-distended [No Masses] : no abdominal mass palpated [No HSM] : no HSM [Normal Bowel Sounds] : normal bowel sounds [Normal Supraclavicular Nodes] : no supraclavicular lymphadenopathy [Normal Posterior Cervical Nodes] : no posterior cervical lymphadenopathy [Normal Anterior Cervical Nodes] : no anterior cervical lymphadenopathy [No CVA Tenderness] : no CVA  tenderness [No Spinal Tenderness] : no spinal tenderness [Kyphosis] : no kyphosis [Scoliosis] : no scoliosis [No Joint Swelling] : no joint swelling [Grossly Normal Strength/Tone] : grossly normal strength/tone [No Rash] : no rash [Acne] : no acne [Coordination Grossly Intact] : coordination grossly intact [No Focal Deficits] : no focal deficits [Normal Gait] : normal gait [Deep Tendon Reflexes (DTR)] : deep tendon reflexes were 2+ and symmetric [Speech Grossly Normal] : speech grossly normal [Memory Grossly Normal] : memory grossly normal [Normal Affect] : the affect was normal [Alert and Oriented x3] : oriented to person, place, and time [Normal Mood] : the mood was normal [Normal Insight/Judgement] : insight and judgment were intact [Normal] : affect was normal and insight and judgment were intact [de-identified] : overweight

## 2025-07-13 NOTE — ASSESSMENT
[FreeTextEntry1] : RICKY CARMONA is a 61 year old F with a past medical history as above who presents for general follow up and medical weight loss program, hyperlipidemia, hypothyroidism, R/O JAKE.